# Patient Record
Sex: FEMALE | Race: WHITE
[De-identification: names, ages, dates, MRNs, and addresses within clinical notes are randomized per-mention and may not be internally consistent; named-entity substitution may affect disease eponyms.]

---

## 2018-03-22 ENCOUNTER — HOSPITAL ENCOUNTER (OUTPATIENT)
Dept: HOSPITAL 80 - FIMAGING | Age: 43
End: 2018-03-22
Attending: OBSTETRICS & GYNECOLOGY
Payer: COMMERCIAL

## 2018-03-22 DIAGNOSIS — Z12.31: Primary | ICD-10-CM

## 2018-10-19 NOTE — GHP
DATE OF ADMISSION:  10/23/2018



ADMITTING DIAGNOSIS:  Missed  at 6 weeks.



HISTORY OF PRESENT ILLNESS:  The patient is a 43-year-old  1, para 1-0-0-
2, previous twin birth, who presents with a last menstrual period of 2018
, at about 9 weeks 1 day by last menstrual period, who presents for her new OB 
intake of an unplanned pregnancy.  The patient had an ultrasound done, showing 
size less than dates by 21 days; embryo and yolk sac seen measuring 6 weeks and 
1 day.  There was no cardiac activity seen, and ultrasound consistent with a 
missed AB.  The patient denies any vaginal bleeding or any cramping at this 
time.  She also denies any fevers, chills, nausea, or vomiting.  She is anxious 
and wants to proceed with surgery to "get it over with."  She has twin boys at 
home and does not want to start miscarrying at home.  Patient is Rh positive.



PAST OB HISTORY:  In 2016, she had a vaginal delivery of viable twins.  
Baby A was a male weighing 4 pounds 8 ounces, baby B male weighing 5 pounds 3 
ounces at 37 weeks 1 day.  Pregnancy complicated by IUGR.



GYN HISTORY:  Age of menarche was 15.  Cycles are irregular.  Patient has a 
long history of abnormal Pap smears with multiple colposcopies, but no 
treatment.  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  Patient denies exposure to any other STDs.



MEDICATIONS:  Prenatal vitamins.



ALLERGIES:  No known drug allergies.



PAST MEDICAL HISTORY:  Unremarkable.



PAST SURGICAL HISTORY:  Unremarkable.



FAMILY HISTORY:  Mother, father, maternal grandmother, with hypertension.  
Paternal grandfather, maternal grandfather, and father with asthma; they were 
smokers.  Paternal grandmother  in her 60s secondary to ovarian cancer.
  Maternal grandmother  secondary to bone cancer.  Maternal grandfather 
 secondary to lung cancer.



SOCIAL HISTORY: The patient is  and lives with her  and their 
twin boys.  Patient denies any alcohol, tobacco or illicit drug use.



REVIEW OF SYSTEMS:  Ten point review of systems negative.  Pertinent positives 
noted in HPI.



LABS:  Quant HCG (10/16) of 50,991 and (10/18) 48,720.  Progesterone is 13.2.  
The patient is O positive.



EXAMINATION:  VITAL SIGNS:  On admission, vital signs are stable.  Patient is 
afebrile well-nourished, well-developed female. Alert and oriented x3.  No 
apparent distress.  SKIN:  Warm, dry without rash.  NEURO:  Grossly intact.  
CARDIOVASCULAR:  Regular rate and rhythm.  LUNGS:  Clear to auscultation 
bilaterally.  ABDOMEN:  Soft, nontender, nondistended.  PELVIC EXAM:  Deferred.
  EXTREMITIES:  Normal to inspection without calf tenderness or edema.



ASSESSMENT/PLAN:  Patient is a 43-year-old  2, para 1-0-0-2 with a 
missed  at 6 weeks.



1.  Admit to Labor and Delivery.

2.  Plan for suction dilation and curettage under u/s guidance.

3.  Surgical consents to be done at the bedside.  Discussed risks, benefits, 
alternatives of the procedure including but not limited to, bleeding, infection
, and risk of uterine perforation.

4.  Patient understands all risks of this procedure and wants to proceed.

5.  Discussed the procedure, its limitations, n.p.o. status, and postop 
recovery.   

6.  Antibiotics on call to operating room, will give oral doxycycline prior to 
the surgery and then again postoperatively in recovery room. 

7.  Sequential compression devices for deep venous thrombosis prophylaxis.



Job #:  [id number]

MTDD

## 2018-10-23 ENCOUNTER — HOSPITAL ENCOUNTER (OUTPATIENT)
Dept: HOSPITAL 80 - FOBOP | Age: 43
Discharge: HOME | End: 2018-10-23
Attending: OBSTETRICS & GYNECOLOGY
Payer: COMMERCIAL

## 2018-10-23 VITALS — SYSTOLIC BLOOD PRESSURE: 104 MMHG | DIASTOLIC BLOOD PRESSURE: 65 MMHG

## 2018-10-23 DIAGNOSIS — O02.1: Primary | ICD-10-CM

## 2018-10-23 PROCEDURE — 10D17ZZ EXTRACTION OF PRODUCTS OF CONCEPTION, RETAINED, VIA NATURAL OR ARTIFICIAL OPENING: ICD-10-PCS | Performed by: OBSTETRICS & GYNECOLOGY

## 2018-10-23 NOTE — PDANEPAE
ANE History of Present Illness





Missed Ab.





ANE Past Medical History





- Cardiovascular History


Hx Hypertension: No


Hx Arrhythmias: No


Hx Chest Pain: No


Hx Coronary Artery / Peripheral Vascular Disease: No


Hx CHF / Valvular Disease: No


Hx Palpitations: No





- Pulmonary History


Hx COPD: No


Hx Asthma/Reactive Airway Disease: No


Hx Recent Upper Respiratory Infection: No


Hx Oxygen in Use at Home: No


Hx Sleep Apnea: No





- Endocrine History


Hx Diabetes: No


Hypothyroid: No


Hyperthyroid: No


Obesity: no





- Chronic Pain History


Chronic Pain: No





- Surgical History


Prior Surgeries: Twin delivery with labor epidural.





ANE Review of Systems


Review of systems is: negative


Review of Systems: 








- Exercise capacity


Exercise capacity: >=4 METS





ANE Patient History





- Allergies


Allergies/Adverse Reactions: 








No Known Allergies Allergy (Unverified 12/07/15 11:31)


 








- Home Medications


Home Medications: 








Pnv Cmb#21/Iron/Folic Acid 1 tab PO DAILY 12/14/15 [Last Taken 01/18/16 07:00]








- NPO status


NPO Status: no food or drink >8 hours


NPO Since - Liquids (Date): 10/23/18


NPO Since - Solids (Date): 10/22/18


NPO Since - Solids (Time): 10:00





- Smoking Hx


Smoking Status: Never smoked





- Alcohol Use


Alcohol Use: Rarely





- Family Anes Hx


Family Anes Hx: neg - N/A





ANE Labs/Vital Signs





- Vital Signs


Blood Pressure: 111/84


O2 Sat (%): 97


Height: 165.1 cm


Weight: 51.256 kg





ANE Physical Exam





- Airway


Neck exam: FROM


Mallampati Score: Class 1


Mouth exam: normal dental/mouth exam





- Pulmonary


Pulmonary: no respiratory distress





- Cardiovascular


Cardiovascular: regular rate and rhythym





- ASA Status


ASA Status: I





ANE Anesthesia Plan


Anesthesia Plan: GA with mask

## 2018-10-23 NOTE — GOP
DATE OF OPERATION:  10/23/2018



SURGEON:  Tomeka Miller DO



ASSISTANT:  None.



ANESTHESIA:  GA with mask.



ANESTHESIOLOGIST:  Dilip Ward MD



PREOPERATIVE DIAGNOSIS:  Missed  at 6 weeks.



POSTOPERATIVE DIAGNOSIS:  Missed  at 6 weeks.



PROCEDURE PERFORMED:  Suction D and C under ultrasound guidance.



FINDINGS:  No active bleeding noted.  Cervix was closed and needed to be 
dilated up to a #8 Hegar.  Uterus then sounded to 9 cm.  A moderate amount of 
products of conception noted.  Patient is Rh positive.



SPECIMENS:  Products of conception.



ESTIMATED BLOOD LOSS:  50 cc.



INDICATIONS:  The patient is a 43-year-old, G2, P1-0-0-2, previous twin 
pregnancy, who presented for her new OB intake for an unplanned pregnancy.  The 
patient did have an ultrasound done that revealed an embryo and yolk sac that 
was measuring 21 days behind, but no fetal heart rate was noted.  This was 
consistent with a missed AB.  The patient was anxious, and did not want to 
miscarry at home with her twins boys. She desires to have surgery done as soon 
as possible.  Discussed proceeding to the operating room for a suction D and C.
  We discussed risks of the procedure including but not limited to, bleeding, 
infection, and risk of uterine perforation.  The patient understands all risks 
of the procedure and wants to proceed.  She was properly consented.



DESCRIPTION OF PROCEDURE:  The patient was taken back to the operating room 
where anesthesia was obtained without difficulty.  The patient was prepped and 
draped in the usual sterile fashion in dorsal lithotomy position.  A weighted 
speculum was placed.  The anterior lip of the cervix was grasped with an Allis 
clamp.  At this time, no active bleeding was seen and the cervix was closed. 
The cervix had to be dilated up to a #8 Hegar and then the uterus was gently 
sounded to 9 cm.  Then using a curved 8 mm suction curette, this was advanced 
up into the uterine cavity without difficulty and was used to suction the 
contents of the uterus.  This was done multiple times under ultrasound guidance 
with removal of moderate products of conception.  We then turned our attention 
to a sharp curette which was advanced into the uterine cavity and used to 
scrape the 4 walls of the uterus until a gritty texture was noted.  At this time
, the suction curette was advanced one additional time to suction any remaining 
products of pregnancy.  Looking at the ultrasound, there appeared to be a thin 
endometrial stripe noted at this time with removal of all products of 
pregnancy.  Minimal bleeding was noted.  All instruments were then removed from 
the vagina.  Hemostasis was visualized.  Sponge, lap, and instrument counts 
were correct x2.  There were no complications.  Patient tolerated the procedure 
well.  The patient was then taken out of dorsal lithotomy position, awakened, 
and taken to the recovery room in stable condition.



IV FLUIDS:  1 L of LR.



URINE OUTPUT:  Patient emptied her bladder prior to the procedure.



COMPLICATIONS:  None.





Job #:  [id number]

MTDD

## 2018-10-23 NOTE — POSTOPPROG
Post Op Note


Date of Operation: 10/23/18


Surgeon: Tomeka Miller


Assistant: None


Anesthesiologist: Dr. Ward


Anesthesia: Other (Specify) (GA with mask)


Pre-op Diagnosis: Missed Ab at 6 weeks


Post-op Diagnosis: Missed Ab at 6 weeks


Indication: 44 y/o  who presented for new Ob intake and had u/s c/w 

missed AB.


Procedure: Suction D&C under u/s guidance


Findings: No active bleeding; cx closed. Uterus sounded to 9 cm. Mod amt POCs. 

Rh pos


Inf/Abcess present in the surg proc area at time of surgery?: No


Depth: Organ Space


EBL:  (50cc)


Total fluids administered: 1 L LR                               UO: pt emptied 

bladder prior to OR


Complications: 





none





Specimen(s): 





POCs

## 2019-04-23 ENCOUNTER — HOSPITAL ENCOUNTER (OUTPATIENT)
Dept: HOSPITAL 80 - FIMAGING | Age: 44
End: 2019-04-23
Attending: OBSTETRICS & GYNECOLOGY
Payer: COMMERCIAL

## 2019-04-23 DIAGNOSIS — Z12.31: Primary | ICD-10-CM
